# Patient Record
Sex: MALE | Race: BLACK OR AFRICAN AMERICAN | NOT HISPANIC OR LATINO | Employment: FULL TIME | URBAN - METROPOLITAN AREA
[De-identification: names, ages, dates, MRNs, and addresses within clinical notes are randomized per-mention and may not be internally consistent; named-entity substitution may affect disease eponyms.]

---

## 2019-10-25 ENCOUNTER — HOSPITAL ENCOUNTER (EMERGENCY)
Facility: HOSPITAL | Age: 34
Discharge: HOME | End: 2019-10-25
Attending: EMERGENCY MEDICINE

## 2019-10-25 ENCOUNTER — APPOINTMENT (EMERGENCY)
Dept: RADIOLOGY | Facility: HOSPITAL | Age: 34
End: 2019-10-25

## 2019-10-25 VITALS
RESPIRATION RATE: 18 BRPM | TEMPERATURE: 97.3 F | DIASTOLIC BLOOD PRESSURE: 107 MMHG | SYSTOLIC BLOOD PRESSURE: 184 MMHG | HEART RATE: 100 BPM | WEIGHT: 231.48 LBS | OXYGEN SATURATION: 100 %

## 2019-10-25 DIAGNOSIS — T78.40XA ALLERGIC STATE, INITIAL ENCOUNTER: ICD-10-CM

## 2019-10-25 DIAGNOSIS — R07.9 CHEST PAIN, UNSPECIFIED: Primary | ICD-10-CM

## 2019-10-25 PROCEDURE — 99285 EMERGENCY DEPT VISIT HI MDM: CPT

## 2019-10-25 PROCEDURE — 71046 X-RAY EXAM CHEST 2 VIEWS: CPT

## 2019-10-25 PROCEDURE — 93005 ELECTROCARDIOGRAM TRACING: CPT

## 2019-10-25 PROCEDURE — 99284 EMERGENCY DEPT VISIT MOD MDM: CPT | Performed by: PHYSICIAN ASSISTANT

## 2019-10-25 RX ORDER — DIPHENHYDRAMINE HCL 25 MG
25 TABLET ORAL EVERY 6 HOURS
Qty: 20 TABLET | Refills: 0 | Status: SHIPPED | OUTPATIENT
Start: 2019-10-25

## 2019-10-25 RX ORDER — FAMOTIDINE 20 MG/1
20 TABLET, FILM COATED ORAL 2 TIMES DAILY
Qty: 30 TABLET | Refills: 0 | Status: SHIPPED | OUTPATIENT
Start: 2019-10-25 | End: 2022-01-27

## 2019-10-25 RX ORDER — FAMOTIDINE 20 MG/1
40 TABLET, FILM COATED ORAL ONCE
Status: COMPLETED | OUTPATIENT
Start: 2019-10-25 | End: 2019-10-25

## 2019-10-25 RX ORDER — DIPHENHYDRAMINE HCL 25 MG
25 TABLET ORAL ONCE
Status: COMPLETED | OUTPATIENT
Start: 2019-10-25 | End: 2019-10-25

## 2019-10-25 RX ADMIN — FAMOTIDINE 40 MG: 20 TABLET ORAL at 09:28

## 2019-10-25 RX ADMIN — DIPHENHYDRAMINE HCL 25 MG: 25 TABLET ORAL at 09:28

## 2019-10-25 NOTE — ED PROVIDER NOTES
History  Chief Complaint   Patient presents with    Chest Pain     "I think I am allergic to pistachios alejandra for like a week after I keep eating them I get chest pains and stomach pains for like a week or 2"       34 yo M with PMH HTN presenting for evaluation of cp  Patient reports over the past 2 weeks he has had intermittent episodes of both chest pain and abdominal pain  Patient states that both the abdominal and chest pain occur only after eating pistachios  Pt reports for 3 days he did not eat pistachios and had complete resolution of symptoms  He states he ordered pasta last night and there was pistachios in it which caused the symptoms again  Pt reports he has generalized abd pain and diarrhea along with a feeling of transient chest tightness that has now resolved  Pt denies symptoms currently  Denies any wheezing, cough, stridor, sob, nausea or vomiting  Pt reports taking benadryl last night after eating pasta which relieved symptoms  No fevers, chills or sweats  None       Past Medical History:   Diagnosis Date    Hypertension        Past Surgical History:   Procedure Laterality Date    KNEE SURGERY         History reviewed  No pertinent family history  I have reviewed and agree with the history as documented  Social History     Tobacco Use    Smoking status: Never Smoker    Smokeless tobacco: Never Used   Substance Use Topics    Alcohol use: Yes    Drug use: Not Currently        Review of Systems   All other systems reviewed and are negative  Physical Exam  Physical Exam   Constitutional: He is oriented to person, place, and time  He appears well-developed and well-nourished  Non-toxic appearance  He does not appear ill  No distress  HENT:   Head: Normocephalic and atraumatic  Eyes: Conjunctivae are normal    EOM grossly intact   Neck: Normal range of motion  Neck supple  No JVD present  Cardiovascular: Normal rate  No murmur heard    Pulmonary/Chest: Effort normal  He has no decreased breath sounds  He has no wheezes  He has no rhonchi  He has no rales  Abdominal: Soft  Musculoskeletal:   FROM, steady gait, cap refill brisk, strength and sensation grossly intact throughout   Neurological: He is alert and oriented to person, place, and time  Skin: Skin is warm and dry  Capillary refill takes less than 2 seconds  Psychiatric: He has a normal mood and affect  His behavior is normal    Nursing note and vitals reviewed  Vital Signs  ED Triage Vitals [10/25/19 0854]   Temperature Pulse Respirations Blood Pressure SpO2   (!) 97 3 °F (36 3 °C) 100 18 (!) 184/107 100 %      Temp Source Heart Rate Source Patient Position - Orthostatic VS BP Location FiO2 (%)   Tympanic Monitor Sitting Left arm --      Pain Score       7           Vitals:    10/25/19 0854   BP: (!) 184/107   Pulse: 100   Patient Position - Orthostatic VS: Sitting         Visual Acuity      ED Medications  Medications   diphenhydrAMINE (BENADRYL) tablet 25 mg (25 mg Oral Given 10/25/19 0928)   famotidine (PEPCID) tablet 40 mg (40 mg Oral Given 10/25/19 5865)       Diagnostic Studies  Results Reviewed     None                 XR chest 2 views   Final Result by Marshall Barahona MD (10/25 1016)      No acute cardiopulmonary disease              Workstation performed: [id number]                    Procedures  ECG 12 Lead Documentation Only  Date/Time: 10/25/2019 9:15 AM  Performed by: Rosalee De PA-C  Authorized by: Rosalee De PA-C     Indications / Diagnosis:  Cp  ECG reviewed by me, the ED Provider: yes    Patient location:  ED  Interpretation:     Interpretation: normal    Rate:     ECG rate:  92    ECG rate assessment: normal    Rhythm:     Rhythm: sinus rhythm    Ectopy:     Ectopy: none    QRS:     QRS axis:  Normal  Conduction:     Conduction: normal    ST segments:     ST segments:  Normal  T waves:     T waves: non-specific    Comments:      qtc 395           ED Course MDM  Number of Diagnoses or Management Options  Allergic state, initial encounter:   Chest pain, unspecified:   Diagnosis management comments: 49-year-old male presenting for evaluation of intermittent episodes of chest tightness along with abdominal pain specifically after eating pistachios, patient states he did take Benadryl last night with improvement of his symptoms, he did have complete resolution of his symptoms sometime last week when he went 3 days without eating pistachios, patient was given Benadryl and Pepcid in the ED, chest x-ray and EKG were normal, he appears well, no respiratory distress, he is afebrile nontoxic no acute distress, follow up with PCP and avoid pistachios at home    All labs and imaging discussed with patient, strict return to ED precautions discussed  Pt verbalizes understanding and agrees with plan  Pt is stable for discharge    Portions of the record may have been created with voice recognition software  Occasional wrong word or "sound a like" substitutions may have occurred due to the inherent limitations of voice recognition software  Read the chart carefully and recognize, using context, where substitutions have occurred  Disposition  Final diagnoses:   Chest pain, unspecified   Allergic state, initial encounter     Time reflects when diagnosis was documented in both MDM as applicable and the Disposition within this note     Time User Action Codes Description Comment    10/25/2019  9:48 AM Latasha TOM Add [R07 9] Chest pain, unspecified     10/25/2019  9:48 AM Latasha TOM Add [T78 40XA] Allergic state, initial encounter       ED Disposition     ED Disposition Condition Date/Time Comment    Discharge Stable Fri Oct 25, 2019  9:47 AM Karyle Mater discharge to home/self care              Follow-up Information     Follow up With Specialties Details Why Contact Info Additional [facility] Call in 1 day  [address], [address]  [facility], [address], [address] [place], [place], [address]          Discharge Medication List as of 10/25/2019  9:49 AM      START taking these medications    Details   diphenhydrAMINE (BENADRYL) 25 mg tablet Take 1 tablet (25 mg total) by mouth every 6 (six) hours, Starting Fri 10/25/2019, Print      famotidine (PEPCID) 20 mg tablet Take 1 tablet (20 mg total) by mouth 2 (two) times a day, Starting Fri 10/25/2019, Print           No discharge procedures on file      ED Provider  Electronically Signed by           Garcia Fuentes PA-C  10/25/19 4426

## 2019-10-26 LAB
ATRIAL RATE: 92 BPM
P AXIS: 63 DEGREES
PR INTERVAL: 160 MS
QRS AXIS: -21 DEGREES
QRSD INTERVAL: 80 MS
QT INTERVAL: 320 MS
QTC INTERVAL: 395 MS
T WAVE AXIS: -11 DEGREES
VENTRICULAR RATE: 92 BPM

## 2019-10-26 PROCEDURE — 93010 ELECTROCARDIOGRAM REPORT: CPT | Performed by: INTERNAL MEDICINE

## 2019-10-28 DIAGNOSIS — I10 ESSENTIAL HYPERTENSION: Primary | ICD-10-CM

## 2019-10-28 RX ORDER — HYDROCHLOROTHIAZIDE 12.5 MG/1
12.5 CAPSULE, GELATIN COATED ORAL DAILY
Qty: 30 CAPSULE | Refills: 11 | Status: SHIPPED | OUTPATIENT
Start: 2019-10-28 | End: 2021-06-08

## 2019-12-12 DIAGNOSIS — T78.40XA ALLERGIC STATE, INITIAL ENCOUNTER: Primary | ICD-10-CM

## 2019-12-12 RX ORDER — EPINEPHRINE 0.3 MG/.3ML
0.3 INJECTION SUBCUTANEOUS ONCE
Qty: 0.6 ML | Refills: 0 | Status: SHIPPED | OUTPATIENT
Start: 2019-12-12 | End: 2022-01-27

## 2021-06-07 DIAGNOSIS — I10 ESSENTIAL HYPERTENSION: ICD-10-CM

## 2021-06-08 RX ORDER — HYDROCHLOROTHIAZIDE 12.5 MG/1
CAPSULE, GELATIN COATED ORAL
Qty: 30 CAPSULE | Refills: 0 | Status: SHIPPED | OUTPATIENT
Start: 2021-06-08 | End: 2022-04-07 | Stop reason: SDUPTHER

## 2021-11-23 ENCOUNTER — TELEPHONE (OUTPATIENT)
Dept: OTHER | Facility: OTHER | Age: 36
End: 2021-11-23

## 2022-02-22 ENCOUNTER — LAB (OUTPATIENT)
Dept: LAB | Facility: HOSPITAL | Age: 37
End: 2022-02-22
Attending: ALLERGY & IMMUNOLOGY
Payer: COMMERCIAL

## 2022-02-22 DIAGNOSIS — R22.1 THROAT SWELLING: ICD-10-CM

## 2022-02-22 DIAGNOSIS — I10 HYPERTENSION, UNSPECIFIED TYPE: ICD-10-CM

## 2022-02-22 DIAGNOSIS — R23.2 FLUSHING: ICD-10-CM

## 2022-02-22 DIAGNOSIS — L50.1 IDIOPATHIC URTICARIA: ICD-10-CM

## 2022-02-22 DIAGNOSIS — R14.0 ABDOMINAL DISTENSION, GASEOUS: ICD-10-CM

## 2022-02-22 DIAGNOSIS — R10.9 ABDOMINAL PAIN, UNSPECIFIED ABDOMINAL LOCATION: ICD-10-CM

## 2022-02-22 DIAGNOSIS — R07.89 CHEST TIGHTNESS: ICD-10-CM

## 2022-02-22 DIAGNOSIS — R10.30 LOWER ABDOMINAL PAIN: ICD-10-CM

## 2022-02-22 LAB
IGA SERPL-MCNC: 220 MG/DL (ref 70–400)
MAGNESIUM SERPL-MCNC: 2 MG/DL (ref 1.6–2.6)

## 2022-02-22 PROCEDURE — 82784 ASSAY IGA/IGD/IGG/IGM EACH: CPT

## 2022-02-22 PROCEDURE — 86258 DGP ANTIBODY EACH IG CLASS: CPT

## 2022-02-22 PROCEDURE — 86364 TISS TRNSGLTMNASE EA IG CLAS: CPT

## 2022-02-22 PROCEDURE — 83735 ASSAY OF MAGNESIUM: CPT

## 2022-02-22 PROCEDURE — 86231 EMA EACH IG CLASS: CPT

## 2022-02-23 LAB — TTG IGG SER-ACNC: <2 U/ML (ref 0–5)

## 2022-02-24 LAB
ENDOMYSIUM IGA SER QL: NEGATIVE
GLIADIN PEPTIDE IGA SER-ACNC: 4 UNITS (ref 0–19)
GLIADIN PEPTIDE IGG SER-ACNC: 3 UNITS (ref 0–19)
IGA SERPL-MCNC: 221 MG/DL (ref 90–386)
TTG IGA SER-ACNC: <2 U/ML (ref 0–3)
TTG IGA SER-ACNC: <2 U/ML (ref 0–3)
TTG IGG SER-ACNC: <2 U/ML (ref 0–5)

## 2022-03-02 ENCOUNTER — HOSPITAL ENCOUNTER (OUTPATIENT)
Dept: CT IMAGING | Facility: HOSPITAL | Age: 37
Discharge: HOME | End: 2022-03-02
Payer: COMMERCIAL

## 2022-03-02 DIAGNOSIS — R14.0 ABDOMINAL DISTENSION, GASEOUS: ICD-10-CM

## 2022-03-02 DIAGNOSIS — R10.30 ABDOMINAL PAIN, LOWER: ICD-10-CM

## 2022-03-02 PROCEDURE — G1004 CDSM NDSC: HCPCS

## 2022-03-02 PROCEDURE — 74177 CT ABD & PELVIS W/CONTRAST: CPT

## 2022-03-02 RX ADMIN — IOHEXOL 100 ML: 350 INJECTION, SOLUTION INTRAVENOUS at 10:45

## 2022-03-24 ENCOUNTER — TELEPHONE (OUTPATIENT)
Dept: FAMILY MEDICINE CLINIC | Facility: CLINIC | Age: 37
End: 2022-03-24

## 2022-03-24 NOTE — TELEPHONE ENCOUNTER
I called and left a message for [facility] requesting he please return the office's call he was referred to our office by Cindy Mccallum

## 2022-03-24 NOTE — TELEPHONE ENCOUNTER
Pricilla Martinez called from [facility]  to refer pt to our office to see Jonathan Marsh for high bp  She requested someone call pt to schedule an appointment

## 2022-03-25 NOTE — TELEPHONE ENCOUNTER
lmom requesting call back from pt to schedule a new pt  Appointment, WE received a call form Dr Bernard

## 2022-03-28 NOTE — TELEPHONE ENCOUNTER
lmom requesting call back from [facility] to schedule new pt appointment  Have attempted to contact pt numerous times with no response

## 2022-04-05 ENCOUNTER — HOSPITAL ENCOUNTER (OUTPATIENT)
Dept: RADIOLOGY | Facility: HOSPITAL | Age: 37
Discharge: HOME | End: 2022-04-05
Payer: COMMERCIAL

## 2022-04-05 DIAGNOSIS — M54.17 LUMBOSACRAL NEURITIS: ICD-10-CM

## 2022-04-05 DIAGNOSIS — M54.2 CERVICALGIA: ICD-10-CM

## 2022-04-05 DIAGNOSIS — M54.12 BRACHIAL NEURITIS: ICD-10-CM

## 2022-04-05 DIAGNOSIS — M54.15 RADICULOPATHY OF THORACOLUMBAR REGION: ICD-10-CM

## 2022-04-05 DIAGNOSIS — M46.05: ICD-10-CM

## 2022-04-05 PROCEDURE — 72110 X-RAY EXAM L-2 SPINE 4/>VWS: CPT

## 2022-04-05 PROCEDURE — 72220 X-RAY EXAM SACRUM TAILBONE: CPT

## 2022-04-05 PROCEDURE — 72050 X-RAY EXAM NECK SPINE 4/5VWS: CPT

## 2022-04-07 ENCOUNTER — DOCUMENTATION (OUTPATIENT)
Dept: CARDIOLOGY CLINIC | Facility: CLINIC | Age: 37
End: 2022-04-07

## 2022-04-07 DIAGNOSIS — I10 ESSENTIAL HYPERTENSION: Primary | ICD-10-CM

## 2022-04-07 RX ORDER — HYDROCHLOROTHIAZIDE 12.5 MG/1
12.5 CAPSULE, GELATIN COATED ORAL DAILY
Qty: 30 CAPSULE | Refills: 11 | Status: SHIPPED | OUTPATIENT
Start: 2022-04-07 | End: 2023-04-07

## 2022-09-13 ENCOUNTER — CONSULT (OUTPATIENT)
Dept: SURGERY | Facility: CLINIC | Age: 37
End: 2022-09-13
Payer: COMMERCIAL

## 2022-09-13 VITALS — TEMPERATURE: 97.4 F | RESPIRATION RATE: 16 BRPM | WEIGHT: 227.8 LBS | BODY MASS INDEX: 31.89 KG/M2 | HEIGHT: 71 IN

## 2022-09-13 DIAGNOSIS — M53.3 SACROILIAC DYSFUNCTION: ICD-10-CM

## 2022-09-13 DIAGNOSIS — M62.08 RECTUS DIASTASIS: Primary | ICD-10-CM

## 2022-09-13 PROCEDURE — 99243 OFF/OP CNSLTJ NEW/EST LOW 30: CPT | Performed by: SURGERY

## 2022-09-13 NOTE — LETTER
September 16, 2022     Patient: Betty Kay  YOB: 1985  Date of Visit: 9/13/2022      To Whom it May Concern:    Betty Kay is under my professional care  Ascencionpetar Bui was seen in my office on 9/13/2022  Due to his ongoing complaints of back pain  And hip pain do not recommend prolonged periods of sitting until he has been evaluated by Orthopedic surgery  If you have any questions or concerns, please don't hesitate to call           Sincerely,          Brie Rowland MD        CC: Betty Rahul

## 2022-09-13 NOTE — PROGRESS NOTES
Assessment/Plan:    Rectus diastasis  I reviewed his CT scan with him and showed him his images  On examination does have a small separation from his rectus muscle superiorly  He has a very small subcentimeter umbilical hernia  Discussed his small hernia explained this can be repair but I do not believe the source of his symptoms this time  Regards to his diastasis again this can be repaired with a plication but this often falls under the purview of Plastic surgery and I will refer him to see Plastic surgery for this  Sacroiliac dysfunction  He is very concerned that he has some form of sacroiliac dysfunction or injury as that is the source of lot of his symptoms and pains  He is asking about MRI for evaluation  I told him he should be evaluated by Orthopedics prior to any imaging or referral to physical therapy  Diagnoses and all orders for this visit:    Rectus diastasis  -     Ambulatory Referral to Plastic Surgery; Future    Sacroiliac dysfunction  -     Ambulatory Referral to Orthopedic Surgery; Future          Subjective:      Patient ID: [id number] Kearns is a 39 y o  male  Mr Genesis Mata is a 59-year-old gentleman here for evaluation of abdominal pain  He has multiple complaints for which he is looking for answers  He is a  and is very active  He states he had injuries he believes to his SI joint plane football many years ago  He follows with a chiropractor to be reset every so often  So he has significant pains back there  He believes that this has caused him to be out of alignment causing abdominal pain and discomfort  He had evaluation with a CT scan in March  There were no acute findings  He has lot of complaints of abdominal bloating and the pain across the lower abdomen  He has been wearing a a elastic abdominal binder for relief    He states he had a client that was with physical therapist that examined his abdominal wall and push pins rectus muscles and said he had a large hernia and was referred here for evaluation  He denies nausea vomiting fevers or chills  The following portions of the patient's history were reviewed and updated as appropriate: allergies, current medications, past family history, past medical history, past social history, past surgical history and problem list     Review of Systems   Constitutional: Negative for chills and fever  HENT: Negative for ear pain and sore throat  Eyes: Negative for pain and visual disturbance  Respiratory: Negative for cough and shortness of breath  Cardiovascular: Negative for chest pain and palpitations  Gastrointestinal: Positive for abdominal pain  Negative for vomiting  Genitourinary: Negative for dysuria and hematuria  Musculoskeletal: Positive for back pain  Negative for arthralgias  Skin: Negative for color change and rash  Neurological: Negative for seizures and syncope  Psychiatric/Behavioral: Negative for agitation and behavioral problems  All other systems reviewed and are negative  Objective:      Temp (!) 97 4 °F (36 3 °C)   Resp 16   Ht 5' 11" (1 803 m)   Wt 103 kg (227 lb 12 8 oz)   BMI 31 77 kg/m²          Physical Exam  Vitals and nursing note reviewed  Constitutional:       General: He is not in acute distress  Appearance: He is well-developed  He is not diaphoretic  HENT:      Head: Normocephalic and atraumatic  Eyes:      Pupils: Pupils are equal, round, and reactive to light  Cardiovascular:      Rate and Rhythm: Normal rate and regular rhythm  Heart sounds: Normal heart sounds  No murmur heard  Pulmonary:      Effort: Pulmonary effort is normal  No respiratory distress  Breath sounds: Normal breath sounds  No wheezing  Abdominal:      General: Bowel sounds are normal       Palpations: Abdomen is soft  Comments: Mild rectus diastasis upper abdomen  Small subcentimeter umbilical hernia     Musculoskeletal:         General: Normal range of motion  Cervical back: Normal range of motion and neck supple  Skin:     General: Skin is warm and dry  Neurological:      Mental Status: He is alert and oriented to person, place, and time     Psychiatric:         Behavior: Behavior normal

## 2022-09-16 NOTE — ASSESSMENT & PLAN NOTE
I reviewed his CT scan with him and showed him his images  On examination does have a small separation from his rectus muscle superiorly  He has a very small subcentimeter umbilical hernia  Discussed his small hernia explained this can be repair but I do not believe the source of his symptoms this time  Regards to his diastasis again this can be repaired with a plication but this often falls under the purview of Plastic surgery and I will refer him to see Plastic surgery for this

## 2022-09-16 NOTE — ASSESSMENT & PLAN NOTE
He is very concerned that he has some form of sacroiliac dysfunction or injury as that is the source of lot of his symptoms and pains  He is asking about MRI for evaluation  I told him he should be evaluated by Orthopedics prior to any imaging or referral to physical therapy

## 2022-12-05 ENCOUNTER — CONSULT (OUTPATIENT)
Dept: PLASTIC SURGERY | Facility: CLINIC | Age: 37
End: 2022-12-05

## 2022-12-05 VITALS — HEIGHT: 71 IN | WEIGHT: 227 LBS | BODY MASS INDEX: 31.78 KG/M2

## 2022-12-05 DIAGNOSIS — K42.9 UMBILICAL HERNIA WITHOUT OBSTRUCTION AND WITHOUT GANGRENE: ICD-10-CM

## 2022-12-05 DIAGNOSIS — R10.84 GENERALIZED ABDOMINAL PAIN: Primary | ICD-10-CM

## 2022-12-05 DIAGNOSIS — M62.08 DIASTASIS OF RECTUS ABDOMINIS: ICD-10-CM

## 2022-12-05 DIAGNOSIS — M62.08 RECTUS DIASTASIS: ICD-10-CM

## 2022-12-06 NOTE — PROGRESS NOTES
Plastic Surgery Consult    Reason for visit: muscular abdominal pain    HPI:  Patient is a 38 y/o male who presents with vague abdominal pain, worse in the midline central region of his abdomen  Patient is a  and is very active and fit  He states that over the past two years he has had increasing pain along the vertical midline and he describes as sharp tearing pain  He has been followed by general surgery and underwent CT scan which did not show any hernia  He was evaluated by general surgery and was noted to have small umbilical hernia  ROS: 12 pt ROS negative, except as otherwise noted in HPI    PMH: sacroiliac dysfunction  FamHx: non-contrib  SurgHx: left ACL repair, right thumb surgery  SocHx: no tobacco, +occasional cigar  Meds: no blood thinners  Allergies: NKDA    PE:  There were no vitals filed for this visit  General: NC/AT, breathing comfortably on RA  Neuro: CN II-XII grossly intact, symmetric reflexes  HEENT: PERRLA, EOMI, external ears normal, no lesions or deformities, neck supple, trachea midline  Respiratory: CTAB, normal respiratory effort  Cardio: RRR, normal S1, S2, no murmur, rubs, gallops  GI: soft, non-tender, non-distended  Extremities/MSK: normal alignment, mobility, gait, no edema  Skin: no rashes, lesions, subcutaneous nodules    BMI: 31 6  Abdomen:  Mild abdominal lipodystrophy  3 cm diastasis, no hernia  Small umbilical hernia    A/P: 38 y/o male with small umbilical hernia and 3 cm rectus diastasis  -I discussed with the patient that I cannot guarantee that a rectus plication would completely resolve the patient's abdominal pain, as he describes it vaguely and diffusely over his entire abdomen but more concentrated at the midline  I discussed rectus plication would preclude him from any core activities for at least 3 months to allow to heal  The plication would also require a long horizontal scar and the umbilicus would be floated   The umbilical hernia would be repaired as well    -All questions answered, concerns addressed   -Will email quote        Ernst Mendez MD   [facility]   [address], [address], [address]    Office: [phone]

## 2024-08-06 ENCOUNTER — OCCMED (OUTPATIENT)
Dept: URGENT CARE | Age: 39
End: 2024-08-06

## 2024-08-06 ENCOUNTER — APPOINTMENT (OUTPATIENT)
Dept: LAB | Age: 39
End: 2024-08-06

## 2024-08-06 DIAGNOSIS — Z02.1 PRE-EMPLOYMENT EXAMINATION: Primary | ICD-10-CM

## 2024-08-06 DIAGNOSIS — Z02.1 PRE-EMPLOYMENT HEALTH SCREENING EXAMINATION: ICD-10-CM

## 2024-08-06 PROCEDURE — 36415 COLL VENOUS BLD VENIPUNCTURE: CPT

## 2024-08-06 PROCEDURE — 86480 TB TEST CELL IMMUN MEASURE: CPT

## 2024-08-07 LAB
GAMMA INTERFERON BACKGROUND BLD IA-ACNC: 0.04 IU/ML
M TB IFN-G BLD-IMP: NEGATIVE
M TB IFN-G CD4+ BCKGRND COR BLD-ACNC: 0.03 IU/ML
M TB IFN-G CD4+ BCKGRND COR BLD-ACNC: 0.04 IU/ML
MITOGEN IGNF BCKGRD COR BLD-ACNC: 9.96 IU/ML

## 2024-09-09 ENCOUNTER — OFFICE VISIT (OUTPATIENT)
Age: 39
End: 2024-09-09
Payer: COMMERCIAL

## 2024-09-09 ENCOUNTER — APPOINTMENT (OUTPATIENT)
Age: 39
End: 2024-09-09
Payer: COMMERCIAL

## 2024-09-09 VITALS
WEIGHT: 250 LBS | SYSTOLIC BLOOD PRESSURE: 150 MMHG | HEIGHT: 71 IN | BODY MASS INDEX: 35 KG/M2 | DIASTOLIC BLOOD PRESSURE: 110 MMHG

## 2024-09-09 DIAGNOSIS — S46.212A RUPTURE OF LEFT DISTAL BICEPS TENDON, INITIAL ENCOUNTER: Primary | ICD-10-CM

## 2024-09-09 DIAGNOSIS — M25.522 PAIN IN LEFT ELBOW: ICD-10-CM

## 2024-09-09 PROCEDURE — 73080 X-RAY EXAM OF ELBOW: CPT

## 2024-09-09 PROCEDURE — 99203 OFFICE O/P NEW LOW 30 MIN: CPT | Performed by: PHYSICIAN ASSISTANT

## 2024-09-09 RX ORDER — FAMOTIDINE 10 MG
10 TABLET ORAL 2 TIMES DAILY
COMMUNITY

## 2024-09-09 RX ORDER — AMLODIPINE BESYLATE 10 MG/1
10 TABLET ORAL DAILY
COMMUNITY
Start: 2024-08-12

## 2024-09-09 RX ORDER — LORATADINE 10 MG/1
10 TABLET ORAL DAILY
COMMUNITY

## 2024-09-09 NOTE — PROGRESS NOTES
"Patient Name:  Marquis Peña  MRN:  [id number]    Assessment & Plan     Left elbow injury while at work 8/29/2024.  Possible distal biceps tendon partial tear versus rupture.  Urgent MRI of the left elbow will be obtained for further evaluation of the distal biceps tendon.  In the meantime continue sling as needed for comfort.  Ice and anti-inflammatories as needed.  Separate work forms completed today with restrictions of no use of the left upper extremity.  Follow-up after MRI with one of our sports surgeons.    Chief Complaint     Left elbow injury    History of the Present Illness     Marquis Peña is a 38 y.o. right-hand-dominant male who reports to the office today for evaluation of his left elbow.  Patient sustained a work injury on 8/29/2024.  Patient works as a .  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  He states he felt a tearing sensation in his left elbow and noted onset of significant pain swelling and bruising.  He also noted associated swelling and a slight deformity about his biceps muscle on the left.  He did note initial significant pain which has since subsided.  He still notes persistent discomfort and weakness in the left upper extremity with associated swelling.  He currently takes nothing for pain.  He has been utilizing a sling for comfort and utilizing ice as well.  He denies any fevers or chills.    Physical Exam     BP (!) 150/110 (BP Location: Left arm, Patient Position: Sitting, Cuff Size: Large)   Ht 5' 11\" (1.803 m)   Wt 113 kg (250 lb)   BMI 34.87 kg/m²     Left elbow: Skin intact.  Soft tissue swelling and ecchymosis are present in the proximal forearm.  No erythema.  There does appear to be a slight Andrew deformity about the biceps musculature on the left when compared to the right.  No palpable deformity over the distal biceps tendon although there is significant tenderness.  Negative hook test.  Full " elbow flexion and extension with pain.  Full pronation and supination of the wrist with pain in the elbow as well.  Elbow stable to varus nondistressed.  4 out of 5 biceps strength.  5 out of 5 tricep strength.  Sensation intact median ulnar and radial nerves.  2+ radial pulse    Eyes: Anicteric sclerae.  ENT: Trachea midline.  Lungs: Normal respiratory effort.  CV: Capillary refill is less than 2 seconds.  Skin: Intact without erythema.  Lymph: No palpable lymphadenopathy.  Neuro: Sensation is grossly intact to light touch.  Psych: Mood and affect are appropriate.    Data Review     I have personally reviewed pertinent films in PACS, and my interpretation follows:    X-rays left elbow 9/9/2024: No acute osseous abnormality.  No fracture or dislocation.  No significant degenerative changes.    Past Medical History:   Diagnosis Date    Allergic rhinitis     Anaphylaxis     Food intolerance     GERD (gastroesophageal reflux disease)     Hypertension     Urticaria        Past Surgical History:   Procedure Laterality Date    KNEE SURGERY         Allergies   Allergen Reactions    Nuts - Food Allergy Anaphylaxis    Pollen Extract Allergic Rhinitis       Current Outpatient Medications on File Prior to Visit   Medication Sig Dispense Refill    amLODIPine (NORVASC) 10 mg tablet Take 10 mg by mouth daily      diphenhydrAMINE (BENADRYL) 25 mg tablet Take 1 tablet (25 mg total) by mouth every 6 (six) hours 20 tablet 0    famotidine (PEPCID) 10 mg tablet Take 10 mg by mouth 2 (two) times a day      loratadine (CLARITIN) 10 mg tablet Take 10 mg by mouth daily      EPINEPHrine (Auvi-Q) 0.3 mg/0.3 mL SOAJ Inject 0.3 mL (0.3 mg total) into a muscle once for 1 dose 4 each 3    hydrochlorothiazide (MICROZIDE) 12.5 mg capsule Take 1 capsule (12.5 mg total) by mouth daily 30 capsule 11    pantoprazole (PROTONIX) 20 mg tablet Take 1 tablet (20 mg total) by mouth daily (Patient not taking: Reported on 9/9/2024) 30 tablet 3     No current  facility-administered medications on file prior to visit.       Social History     Tobacco Use    Smoking status: Never    Smokeless tobacco: Never   Vaping Use    Vaping status: Never Used   Substance Use Topics    Alcohol use: Yes    Drug use: Yes     Types: Marijuana       History reviewed. No pertinent family history.    Review of Systems     As stated in the HPI. All other systems reviewed and are negative.

## 2024-09-14 ENCOUNTER — HOSPITAL ENCOUNTER (OUTPATIENT)
Dept: RADIOLOGY | Facility: HOSPITAL | Age: 39
Discharge: HOME | End: 2024-09-14

## 2024-09-14 DIAGNOSIS — S46.212A RUPTURE OF LEFT DISTAL BICEPS TENDON, INITIAL ENCOUNTER: ICD-10-CM

## 2024-09-14 PROCEDURE — 73221 MRI JOINT UPR EXTREM W/O DYE: CPT

## 2024-09-16 ENCOUNTER — TELEPHONE (OUTPATIENT)
Age: 39
End: 2024-09-16

## 2024-09-16 NOTE — TELEPHONE ENCOUNTER
----- Message from Taiwo White PA-C sent at 9/16/2024  7:50 AM EDT -----  He needs an appointment with either Dr. Kruger or Dr. Lopez or Dr. Matamoros sometime this week for surgical discussion.    Thanks!!  Taiwo Whiet  ----- Message -----  From: Interface, Radiology Results In  Sent: 9/14/2024   7:43 PM EDT  To: Taiwo White PA-C

## 2024-09-16 NOTE — TELEPHONE ENCOUNTER
CLM on VM w/C. Cindy's msg to schedule appt w/Saturnino Suresh, or Jessica this week for surgical discussion as MRI results are back. Await CB. Please schedule. Thank you.

## 2024-09-17 ENCOUNTER — TELEPHONE (OUTPATIENT)
Age: 39
End: 2024-09-17

## 2024-09-17 NOTE — TELEPHONE ENCOUNTER
CLM on pt's id VM w/C. Cindy's msg.  Await CB to schedule w/Saturnino Turk or Jessica.  Please schedule if pt returns call. . Thank you.

## 2024-09-17 NOTE — TELEPHONE ENCOUNTER
----- Message from Taiwo White PA-C sent at 9/16/2024  7:50 AM EDT -----  He needs an appointment with either Dr. Kruger or Dr. Lopez or Dr. Matamoros sometime this week for surgical discussion.    Thanks!!  Taiwo White  ----- Message -----  From: Interface, Radiology Results In  Sent: 9/14/2024   7:43 PM EDT  To: Taiwo White PA-C

## 2024-09-18 NOTE — TELEPHONE ENCOUNTER
Caller: Patient    Doctor: Saskia    Reason for call: Tra called to schedule an appt with Dr. Kruger to discuss surgical options. MRI results are in the chart.  Patient will travel to [place] or [facility].     Call back#: [phone]

## 2024-09-20 ENCOUNTER — OFFICE VISIT (OUTPATIENT)
Dept: OBGYN CLINIC | Facility: MEDICAL CENTER | Age: 39
End: 2024-09-20

## 2024-09-20 VITALS
BODY MASS INDEX: 35 KG/M2 | DIASTOLIC BLOOD PRESSURE: 99 MMHG | HEART RATE: 80 BPM | SYSTOLIC BLOOD PRESSURE: 151 MMHG | HEIGHT: 71 IN | WEIGHT: 250 LBS

## 2024-09-20 DIAGNOSIS — S46.212A RUPTURE OF LEFT DISTAL BICEPS TENDON, INITIAL ENCOUNTER: Primary | ICD-10-CM

## 2024-09-20 PROCEDURE — 99204 OFFICE O/P NEW MOD 45 MIN: CPT | Performed by: ORTHOPAEDIC SURGERY

## 2024-09-20 RX ORDER — TRANEXAMIC ACID 10 MG/ML
1000 INJECTION, SOLUTION INTRAVENOUS ONCE
Status: CANCELLED | OUTPATIENT
Start: 2024-09-24 | End: 2024-09-20

## 2024-09-20 RX ORDER — CHLORHEXIDINE GLUCONATE ORAL RINSE 1.2 MG/ML
15 SOLUTION DENTAL ONCE
Status: CANCELLED | OUTPATIENT
Start: 2024-09-20 | End: 2024-09-20

## 2024-09-20 RX ORDER — CEFAZOLIN SODIUM 2 G/50ML
2000 SOLUTION INTRAVENOUS ONCE
Status: CANCELLED | OUTPATIENT
Start: 2024-09-24 | End: 2024-09-20

## 2024-09-20 NOTE — H&P (VIEW-ONLY)
"Orthopaedic Surgery - Office Note  Marquis Peña (38 y.o. male)   : 1985   MRN: [id number]  Encounter Date: 2024    Assessment / Plan    Left distal biceps rupture    The diagnosis and treatment options were reviewed.  The patient wishes to proceed with Left distal biceps repair.  The risks, benefits, and alternatives were discussed.  Written consent was obtained.  Patient was fitted with an ARC sling today as it will be more comfortable with the cervical strap due to his cervical DDD  Explained the triceps issue seen on MRI appears to be a chronic issue and does not need to be address at this time.  If he has persistent triceps pain following surgery it can be address at a later time.   Follow-up:  Return for Post op.      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 male who presents for evaluation of left elbow pain.  Patient sustained a work injury on 2024.  Patient works as a .  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  He states he felt a tearing sensation in his left elbow and noted onset of significant pain swelling and bruising.  He was seen by Taiwo Mi PA-C who ordered the MRI and referred the patient  here today.     Treatment Summary  Medications / Modalities  None  Bracing / Immobilization  None  Physical Therapy  None  Injections  None  Prior Surgeries  None  Other Treatments  None    Employment / Current Status      Sport / Organization / Current Status  Weight lifting      Review of Systems  Pertinent items are noted in HPI.  All other systems were reviewed and are negative.      Physical Exam  /99   Pulse 80   Ht 5' 11\" (1.803 m)   Wt 113 kg (250 lb)   BMI 34.87 kg/m²   Cons: Appears well.  No apparent distress.  Psych: Alert. " Oriented x3.  Mood and affect normal.  Eyes: PERRLA, EOMI  Resp: Normal effort.  No audible wheezing or stridor.  CV: Palpable pulse.  No discernable arrhythmia.  No LE edema.  Lymph:  No palpable cervical, axillary, or inguinal lymphadenopathy.  Skin: Warm.  No palpable masses.  No visible lesions.  Neuro: Normal muscle tone.  Normal and symmetric DTR's.     Left Elbow Exam  Alignment:  Normal elbow alignment and carrying angle.  Inspection:  No swelling. No edema. No erythema. No ecchymosis.  Palpation:   distal biceps tenderness. Distal biceps tendon is not palpable on exam today   ROM:  Normal elbow ROM.  Strength:  Triceps 5/5. Pain with resisted supination   Stability:  No objective elbow instability.  Stable varus and valgus stress.  Tests:  (-) Hook test.  Neurovascular:  Sensation intact in Ax/R/M/U nerve distributions. 2+ DP & PT pulses.       Studies Reviewed  MRI of left elbow - complete distal biceps rupture with 4.6 cm of retraction, small partial tear triceps along the deeper fibers      Procedures  No procedures today.    Medical, Surgical, Family, and Social History  The patient's medical history, family history, and social history, were reviewed and updated as appropriate.    Past Medical History:   Diagnosis Date    Allergic rhinitis     Anaphylaxis     Food intolerance     GERD (gastroesophageal reflux disease)     Hypertension     Urticaria        Past Surgical History:   Procedure Laterality Date    KNEE SURGERY         History reviewed. No pertinent family history.    Social History     Occupational History    Not on file   Tobacco Use    Smoking status: Never     Passive exposure: Past    Smokeless tobacco: Never   Vaping Use    Vaping status: Never Used   Substance and Sexual Activity    Alcohol use: Yes    Drug use: Yes     Types: Marijuana    Sexual activity: Not on file       Allergies   Allergen Reactions    Nuts - Food Allergy Anaphylaxis    Pollen Extract Allergic Rhinitis          Current Outpatient Medications:     amLODIPine (NORVASC) 10 mg tablet, Take 10 mg by mouth daily, Disp: , Rfl:     famotidine (PEPCID) 10 mg tablet, Take 10 mg by mouth 2 (two) times a day, Disp: , Rfl:     loratadine (CLARITIN) 10 mg tablet, Take 10 mg by mouth daily, Disp: , Rfl:     diphenhydrAMINE (BENADRYL) 25 mg tablet, Take 1 tablet (25 mg total) by mouth every 6 (six) hours (Patient not taking: Reported on 9/20/2024), Disp: 20 tablet, Rfl: 0    EPINEPHrine (Auvi-Q) 0.3 mg/0.3 mL SOAJ, Inject 0.3 mL (0.3 mg total) into a muscle once for 1 dose, Disp: 4 each, Rfl: 3    hydrochlorothiazide (MICROZIDE) 12.5 mg capsule, Take 1 capsule (12.5 mg total) by mouth daily, Disp: 30 capsule, Rfl: 11    pantoprazole (PROTONIX) 20 mg tablet, Take 1 tablet (20 mg total) by mouth daily (Patient not taking: Reported on 9/9/2024), Disp: 30 tablet, Rfl: 3      Emily Gómez MA    Scribe Attestation      I,:  Emily Gómez MA am acting as a scribe while in the presence of the attending physician.:       I,:  Dave Kruger MD personally performed the services described in this documentation    as scribed in my presence.:

## 2024-09-20 NOTE — PROGRESS NOTES
"Orthopaedic Surgery - Office Note  Marquis Peña (38 y.o. male)   : 1985   MRN: [id number]  Encounter Date: 2024    Assessment / Plan    Left distal biceps rupture    The diagnosis and treatment options were reviewed.  The patient wishes to proceed with Left distal biceps repair.  The risks, benefits, and alternatives were discussed.  Written consent was obtained.  Patient was fitted with an ARC sling today as it will be more comfortable with the cervical strap due to his cervical DDD  Explained the triceps issue seen on MRI appears to be a chronic issue and does not need to be address at this time.  If he has persistent triceps pain following surgery it can be address at a later time.   Follow-up:  Return for Post op.      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 male who presents for evaluation of left elbow pain.  Patient sustained a work injury on 2024.  Patient works as a .  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  He states he felt a tearing sensation in his left elbow and noted onset of significant pain swelling and bruising.  He was seen by Taiwo Mi PA-C who ordered the MRI and referred the patient  here today.     Treatment Summary  Medications / Modalities  None  Bracing / Immobilization  None  Physical Therapy  None  Injections  None  Prior Surgeries  None  Other Treatments  None    Employment / Current Status      Sport / Organization / Current Status  Weight lifting      Review of Systems  Pertinent items are noted in HPI.  All other systems were reviewed and are negative.      Physical Exam  /99   Pulse 80   Ht 5' 11\" (1.803 m)   Wt 113 kg (250 lb)   BMI 34.87 kg/m²   Cons: Appears well.  No apparent distress.  Psych: Alert. " Oriented x3.  Mood and affect normal.  Eyes: PERRLA, EOMI  Resp: Normal effort.  No audible wheezing or stridor.  CV: Palpable pulse.  No discernable arrhythmia.  No LE edema.  Lymph:  No palpable cervical, axillary, or inguinal lymphadenopathy.  Skin: Warm.  No palpable masses.  No visible lesions.  Neuro: Normal muscle tone.  Normal and symmetric DTR's.     Left Elbow Exam  Alignment:  Normal elbow alignment and carrying angle.  Inspection:  No swelling. No edema. No erythema. No ecchymosis.  Palpation:   distal biceps tenderness. Distal biceps tendon is not palpable on exam today   ROM:  Normal elbow ROM.  Strength:  Triceps 5/5. Pain with resisted supination   Stability:  No objective elbow instability.  Stable varus and valgus stress.  Tests:  (-) Hook test.  Neurovascular:  Sensation intact in Ax/R/M/U nerve distributions. 2+ DP & PT pulses.       Studies Reviewed  MRI of left elbow - complete distal biceps rupture with 4.6 cm of retraction, small partial tear triceps along the deeper fibers      Procedures  No procedures today.    Medical, Surgical, Family, and Social History  The patient's medical history, family history, and social history, were reviewed and updated as appropriate.    Past Medical History:   Diagnosis Date    Allergic rhinitis     Anaphylaxis     Food intolerance     GERD (gastroesophageal reflux disease)     Hypertension     Urticaria        Past Surgical History:   Procedure Laterality Date    KNEE SURGERY         History reviewed. No pertinent family history.    Social History     Occupational History    Not on file   Tobacco Use    Smoking status: Never     Passive exposure: Past    Smokeless tobacco: Never   Vaping Use    Vaping status: Never Used   Substance and Sexual Activity    Alcohol use: Yes    Drug use: Yes     Types: Marijuana    Sexual activity: Not on file       Allergies   Allergen Reactions    Nuts - Food Allergy Anaphylaxis    Pollen Extract Allergic Rhinitis          Current Outpatient Medications:     amLODIPine (NORVASC) 10 mg tablet, Take 10 mg by mouth daily, Disp: , Rfl:     famotidine (PEPCID) 10 mg tablet, Take 10 mg by mouth 2 (two) times a day, Disp: , Rfl:     loratadine (CLARITIN) 10 mg tablet, Take 10 mg by mouth daily, Disp: , Rfl:     diphenhydrAMINE (BENADRYL) 25 mg tablet, Take 1 tablet (25 mg total) by mouth every 6 (six) hours (Patient not taking: Reported on 9/20/2024), Disp: 20 tablet, Rfl: 0    EPINEPHrine (Auvi-Q) 0.3 mg/0.3 mL SOAJ, Inject 0.3 mL (0.3 mg total) into a muscle once for 1 dose, Disp: 4 each, Rfl: 3    hydrochlorothiazide (MICROZIDE) 12.5 mg capsule, Take 1 capsule (12.5 mg total) by mouth daily, Disp: 30 capsule, Rfl: 11    pantoprazole (PROTONIX) 20 mg tablet, Take 1 tablet (20 mg total) by mouth daily (Patient not taking: Reported on 9/9/2024), Disp: 30 tablet, Rfl: 3      Emily Gómez MA    Scribe Attestation      I,:  Emily Gómez MA am acting as a scribe while in the presence of the attending physician.:       I,:  Dave Kruger MD personally performed the services described in this documentation    as scribed in my presence.:

## 2024-09-20 NOTE — LETTER
September 20, 2024     Patient: Marquis Peña  YOB: 1985  Date of Visit: 9/20/2024      To Whom it May Concern:    Marquis Peña is under my professional care.  was seen in my office on 9/20/2024.  is schedule for surgery 9/24/24 and will remain out of work until his post op visit on 9/30/24.    If you have any questions or concerns, please don't hesitate to call.         Sincerely,          Dave Kruger MD        CC: No Recipients

## 2024-09-23 ENCOUNTER — ANESTHESIA EVENT (OUTPATIENT)
Age: 39
End: 2024-09-23
Payer: COMMERCIAL

## 2024-09-23 NOTE — PRE-PROCEDURE INSTRUCTIONS
Pre-Surgery Instructions:   Medication Instructions    amLODIPine (NORVASC) 10 mg tablet Take day of surgery.    famotidine (PEPCID) 10 mg tablet Take day of surgery.    loratadine (CLARITIN) 10 mg tablet Hold day of surgery.    Medication instructions for day surgery reviewed. Please use only a sip of water to take your instructed medications. Avoid all over the counter vitamins, supplements and NSAIDS for one week prior to surgery per anesthesia guidelines. Tylenol is ok to take as needed.     You will receive a call one business day prior to surgery with an arrival time and hospital directions. If your surgery is scheduled on a Monday, the hospital will be calling you on the Friday prior to your surgery. If you have not heard from anyone by 8pm, please call the hospital supervisor through the hospital  at [phone]. ([facility] [phone] or [place] [phone]).    Do not eat or drink anything after midnight the night before your surgery, including candy, mints, lifesavers, or chewing gum. Do not drink alcohol 24hrs before your surgery. Try not to smoke at least 24hrs before your surgery.       Follow the pre surgery showering instructions as listed in the “My Surgical Experience Booklet” or otherwise provided by your surgeon's office. Do not use a blade to shave the surgical area 1 week before surgery. It is okay to use a clean electric clippers up to 24 hours before surgery. Do not apply any lotions, creams, including makeup, cologne, deodorant, or perfumes after showering on the day of your surgery. Do not use dry shampoo, hair spray, hair gel, or any type of hair products.     No contact lenses, eye make-up, or artificial eyelashes. Remove nail polish, including gel polish, and any artificial, gel, or acrylic nails if possible. Remove all jewelry including rings and body piercing jewelry.     Wear causal clothing that is easy to take on and off. Consider your type of surgery.    Keep any  valuables, jewelry, piercings at home. Please bring any specially ordered equipment (sling, braces) if indicated.    Arrange for a responsible person to drive you to and from the hospital on the day of your surgery. Please confirm the visitor policy for the day of your procedure when you receive your phone call with an arrival time.     Call the surgeon's office with any new illnesses, exposures, or additional questions prior to surgery.    Please reference your “My Surgical Experience Booklet” for additional information to prepare for your upcoming surgery.    Pt verbalized understanding of shower and med instructions. Pt instructed to stop nsaids and supplements prior to surgery.

## 2024-09-24 ENCOUNTER — HOSPITAL ENCOUNTER (OUTPATIENT)
Age: 39
Setting detail: OUTPATIENT SURGERY
Discharge: HOME | End: 2024-09-24
Attending: ORTHOPAEDIC SURGERY | Admitting: ORTHOPAEDIC SURGERY
Payer: COMMERCIAL

## 2024-09-24 ENCOUNTER — ANESTHESIA (OUTPATIENT)
Age: 39
End: 2024-09-24
Payer: COMMERCIAL

## 2024-09-24 VITALS
WEIGHT: 246 LBS | SYSTOLIC BLOOD PRESSURE: 165 MMHG | HEIGHT: 71 IN | RESPIRATION RATE: 12 BRPM | HEART RATE: 88 BPM | DIASTOLIC BLOOD PRESSURE: 94 MMHG | TEMPERATURE: 98 F | BODY MASS INDEX: 34.44 KG/M2 | OXYGEN SATURATION: 95 %

## 2024-09-24 DIAGNOSIS — S46.212A RUPTURE OF LEFT DISTAL BICEPS TENDON, INITIAL ENCOUNTER: Primary | ICD-10-CM

## 2024-09-24 PROCEDURE — 24342 REPAIR OF RUPTURED TENDON: CPT | Performed by: ORTHOPAEDIC SURGERY

## 2024-09-24 PROCEDURE — C1713 ANCHOR/SCREW BN/BN,TIS/BN: HCPCS | Performed by: ORTHOPAEDIC SURGERY

## 2024-09-24 DEVICE — IMPL DELIVERY SYS,DISTAL BICEPS REPR
Type: IMPLANTABLE DEVICE | Site: ELBOW | Status: FUNCTIONAL
Brand: ARTHREX®

## 2024-09-24 RX ORDER — ONDANSETRON 4 MG/1
4 TABLET, ORALLY DISINTEGRATING ORAL EVERY 8 HOURS PRN
Qty: 15 TABLET | Refills: 0 | Status: SHIPPED | OUTPATIENT
Start: 2024-09-24

## 2024-09-24 RX ORDER — ALBUTEROL SULFATE 0.83 MG/ML
2.5 SOLUTION RESPIRATORY (INHALATION) ONCE AS NEEDED
Status: DISCONTINUED | OUTPATIENT
Start: 2024-09-24 | End: 2024-09-24 | Stop reason: HOSPADM

## 2024-09-24 RX ORDER — DEXAMETHASONE SODIUM PHOSPHATE 10 MG/ML
INJECTION, SOLUTION INTRAMUSCULAR; INTRAVENOUS AS NEEDED
Status: DISCONTINUED | OUTPATIENT
Start: 2024-09-24 | End: 2024-09-24

## 2024-09-24 RX ORDER — ROCURONIUM BROMIDE 10 MG/ML
INJECTION, SOLUTION INTRAVENOUS AS NEEDED
Status: DISCONTINUED | OUTPATIENT
Start: 2024-09-24 | End: 2024-09-24

## 2024-09-24 RX ORDER — TRANEXAMIC ACID 10 MG/ML
1000 INJECTION, SOLUTION INTRAVENOUS ONCE
Status: COMPLETED | OUTPATIENT
Start: 2024-09-24 | End: 2024-09-24

## 2024-09-24 RX ORDER — MIDAZOLAM HYDROCHLORIDE 2 MG/2ML
INJECTION, SOLUTION INTRAMUSCULAR; INTRAVENOUS AS NEEDED
Status: DISCONTINUED | OUTPATIENT
Start: 2024-09-24 | End: 2024-09-24

## 2024-09-24 RX ORDER — NAPROXEN 500 MG/1
500 TABLET ORAL 2 TIMES DAILY WITH MEALS
Qty: 60 TABLET | Refills: 0 | Status: SHIPPED | OUTPATIENT
Start: 2024-09-24

## 2024-09-24 RX ORDER — LABETALOL HYDROCHLORIDE 5 MG/ML
5 INJECTION, SOLUTION INTRAVENOUS
Status: DISCONTINUED | OUTPATIENT
Start: 2024-09-24 | End: 2024-09-24 | Stop reason: HOSPADM

## 2024-09-24 RX ORDER — FENTANYL CITRATE 50 UG/ML
INJECTION, SOLUTION INTRAMUSCULAR; INTRAVENOUS AS NEEDED
Status: DISCONTINUED | OUTPATIENT
Start: 2024-09-24 | End: 2024-09-24

## 2024-09-24 RX ORDER — PROPOFOL 10 MG/ML
INJECTION, EMULSION INTRAVENOUS AS NEEDED
Status: DISCONTINUED | OUTPATIENT
Start: 2024-09-24 | End: 2024-09-24

## 2024-09-24 RX ORDER — HYDROMORPHONE HCL/PF 1 MG/ML
0.5 SYRINGE (ML) INJECTION
Status: DISCONTINUED | OUTPATIENT
Start: 2024-09-24 | End: 2024-09-24 | Stop reason: HOSPADM

## 2024-09-24 RX ORDER — SENNOSIDES 8.6 MG
650 CAPSULE ORAL EVERY 6 HOURS PRN
Qty: 90 TABLET | Refills: 0 | Status: SHIPPED | OUTPATIENT
Start: 2024-09-24

## 2024-09-24 RX ORDER — SUCCINYLCHOLINE/SOD CL,ISO/PF 100 MG/5ML
SYRINGE (ML) INTRAVENOUS AS NEEDED
Status: DISCONTINUED | OUTPATIENT
Start: 2024-09-24 | End: 2024-09-24

## 2024-09-24 RX ORDER — ONDANSETRON 2 MG/ML
INJECTION INTRAMUSCULAR; INTRAVENOUS AS NEEDED
Status: DISCONTINUED | OUTPATIENT
Start: 2024-09-24 | End: 2024-09-24

## 2024-09-24 RX ORDER — CEFAZOLIN SODIUM 2 G/50ML
2000 SOLUTION INTRAVENOUS ONCE
Status: COMPLETED | OUTPATIENT
Start: 2024-09-24 | End: 2024-09-24

## 2024-09-24 RX ORDER — OXYCODONE HYDROCHLORIDE 5 MG/1
5 TABLET ORAL EVERY 4 HOURS PRN
Qty: 10 TABLET | Refills: 0 | Status: SHIPPED | OUTPATIENT
Start: 2024-09-24 | End: 2024-10-04

## 2024-09-24 RX ORDER — OXYCODONE HYDROCHLORIDE 10 MG/1
10 TABLET ORAL EVERY 4 HOURS PRN
Status: DISCONTINUED | OUTPATIENT
Start: 2024-09-24 | End: 2024-09-24 | Stop reason: HOSPADM

## 2024-09-24 RX ORDER — OXYCODONE HYDROCHLORIDE 5 MG/1
5 TABLET ORAL EVERY 4 HOURS PRN
Status: DISCONTINUED | OUTPATIENT
Start: 2024-09-24 | End: 2024-09-24 | Stop reason: HOSPADM

## 2024-09-24 RX ORDER — KETOROLAC TROMETHAMINE 30 MG/ML
INJECTION, SOLUTION INTRAMUSCULAR; INTRAVENOUS AS NEEDED
Status: DISCONTINUED | OUTPATIENT
Start: 2024-09-24 | End: 2024-09-24

## 2024-09-24 RX ORDER — SODIUM CHLORIDE, SODIUM LACTATE, POTASSIUM CHLORIDE, CALCIUM CHLORIDE 600; 310; 30; 20 MG/100ML; MG/100ML; MG/100ML; MG/100ML
125 INJECTION, SOLUTION INTRAVENOUS CONTINUOUS
Status: DISCONTINUED | OUTPATIENT
Start: 2024-09-24 | End: 2024-09-24 | Stop reason: HOSPADM

## 2024-09-24 RX ORDER — PROMETHAZINE HYDROCHLORIDE 25 MG/ML
12.5 INJECTION, SOLUTION INTRAMUSCULAR; INTRAVENOUS ONCE AS NEEDED
Status: DISCONTINUED | OUTPATIENT
Start: 2024-09-24 | End: 2024-09-24 | Stop reason: HOSPADM

## 2024-09-24 RX ORDER — MEPERIDINE HYDROCHLORIDE 50 MG/ML
12.5 INJECTION INTRAMUSCULAR; INTRAVENOUS; SUBCUTANEOUS ONCE
Status: DISCONTINUED | OUTPATIENT
Start: 2024-09-24 | End: 2024-09-24 | Stop reason: HOSPADM

## 2024-09-24 RX ORDER — FENTANYL CITRATE/PF 50 MCG/ML
25 SYRINGE (ML) INJECTION
Status: COMPLETED | OUTPATIENT
Start: 2024-09-24 | End: 2024-09-24

## 2024-09-24 RX ORDER — LIDOCAINE HYDROCHLORIDE 10 MG/ML
INJECTION, SOLUTION EPIDURAL; INFILTRATION; INTRACAUDAL; PERINEURAL AS NEEDED
Status: DISCONTINUED | OUTPATIENT
Start: 2024-09-24 | End: 2024-09-24

## 2024-09-24 RX ORDER — ONDANSETRON 2 MG/ML
4 INJECTION INTRAMUSCULAR; INTRAVENOUS ONCE AS NEEDED
Status: DISCONTINUED | OUTPATIENT
Start: 2024-09-24 | End: 2024-09-24 | Stop reason: HOSPADM

## 2024-09-24 RX ORDER — LIDOCAINE HYDROCHLORIDE 10 MG/ML
0.5 INJECTION, SOLUTION EPIDURAL; INFILTRATION; INTRACAUDAL; PERINEURAL ONCE AS NEEDED
Status: DISCONTINUED | OUTPATIENT
Start: 2024-09-24 | End: 2024-09-24 | Stop reason: HOSPADM

## 2024-09-24 RX ORDER — CHLORHEXIDINE GLUCONATE ORAL RINSE 1.2 MG/ML
15 SOLUTION DENTAL ONCE
Status: COMPLETED | OUTPATIENT
Start: 2024-09-24 | End: 2024-09-24

## 2024-09-24 RX ADMIN — FENTANYL CITRATE 25 MCG: 50 INJECTION INTRAMUSCULAR; INTRAVENOUS at 09:10

## 2024-09-24 RX ADMIN — ONDANSETRON 4 MG: 2 INJECTION INTRAMUSCULAR; INTRAVENOUS at 07:36

## 2024-09-24 RX ADMIN — ROCURONIUM BROMIDE 10 MG: 10 INJECTION, SOLUTION INTRAVENOUS at 07:52

## 2024-09-24 RX ADMIN — TRANEXAMIC ACID 1000 MG: 10 INJECTION, SOLUTION INTRAVENOUS at 07:38

## 2024-09-24 RX ADMIN — KETOROLAC TROMETHAMINE 15 MG: 30 INJECTION, SOLUTION INTRAMUSCULAR at 08:07

## 2024-09-24 RX ADMIN — FENTANYL CITRATE 25 MCG: 50 INJECTION INTRAMUSCULAR; INTRAVENOUS at 09:04

## 2024-09-24 RX ADMIN — PROPOFOL 100 MG: 10 INJECTION, EMULSION INTRAVENOUS at 07:34

## 2024-09-24 RX ADMIN — Medication 100 MG: at 07:32

## 2024-09-24 RX ADMIN — ROCURONIUM BROMIDE 45 MG: 10 INJECTION, SOLUTION INTRAVENOUS at 07:36

## 2024-09-24 RX ADMIN — SODIUM CHLORIDE, SODIUM LACTATE, POTASSIUM CHLORIDE, AND CALCIUM CHLORIDE 125 ML/HR: .6; .31; .03; .02 INJECTION, SOLUTION INTRAVENOUS at 06:34

## 2024-09-24 RX ADMIN — FENTANYL CITRATE 25 MCG: 50 INJECTION INTRAMUSCULAR; INTRAVENOUS at 08:05

## 2024-09-24 RX ADMIN — PROPOFOL 200 MG: 10 INJECTION, EMULSION INTRAVENOUS at 07:31

## 2024-09-24 RX ADMIN — FENTANYL CITRATE 25 MCG: 50 INJECTION INTRAMUSCULAR; INTRAVENOUS at 09:14

## 2024-09-24 RX ADMIN — ROCURONIUM BROMIDE 5 MG: 10 INJECTION, SOLUTION INTRAVENOUS at 07:31

## 2024-09-24 RX ADMIN — LIDOCAINE HYDROCHLORIDE 50 MG: 10 SOLUTION INTRAVENOUS at 07:30

## 2024-09-24 RX ADMIN — FENTANYL CITRATE 25 MCG: 50 INJECTION INTRAMUSCULAR; INTRAVENOUS at 09:19

## 2024-09-24 RX ADMIN — CHLORHEXIDINE GLUCONATE 15 ML: 1.2 RINSE ORAL at 06:33

## 2024-09-24 RX ADMIN — LABETALOL HYDROCHLORIDE 5 MG: 5 INJECTION, SOLUTION INTRAVENOUS at 09:18

## 2024-09-24 RX ADMIN — OXYCODONE 5 MG: 5 TABLET ORAL at 09:37

## 2024-09-24 RX ADMIN — CEFAZOLIN SODIUM 2000 MG: 2 SOLUTION INTRAVENOUS at 07:29

## 2024-09-24 RX ADMIN — FENTANYL CITRATE 100 MCG: 50 INJECTION INTRAMUSCULAR; INTRAVENOUS at 07:30

## 2024-09-24 RX ADMIN — DEXAMETHASONE SODIUM PHOSPHATE 10 MG: 10 INJECTION INTRAMUSCULAR; INTRAVENOUS at 07:36

## 2024-09-24 RX ADMIN — MIDAZOLAM 2 MG: 1 INJECTION INTRAMUSCULAR; INTRAVENOUS at 07:23

## 2024-09-24 RX ADMIN — SUGAMMADEX 200 MG: 100 INJECTION, SOLUTION INTRAVENOUS at 08:17

## 2024-09-24 NOTE — ANESTHESIA POSTPROCEDURE EVALUATION
Post-Op Assessment Note    CV Status:  Stable  Pain Score: 0    Pain management: adequate       Mental Status:  Sleepy   Hydration Status:  Stable   PONV Controlled:  Controlled   Airway Patency:  Patent  Airway: intubated    Anethesia notable event occurred.    Staff: Anesthesiologist   Comments: Large doses of propofol needed.  Unable to place tube with MAC 3 DL.  Unable to mask with oral airway and two handed mask.  Intubated using Iglesias 3 blade and stylet.  Only arytenoids visualized.     Difficult intubation letter: given to patient            BP      Temp      Pulse     Resp      SpO2        Large doses of propofol needed.  Unable to place tube with MAC 3 DL.  Unable to mask with oral airway and two handed mask.  Intubated using Iglesias 3 blade and stylet.  Only arytenoids visualized.

## 2024-09-24 NOTE — ANESTHESIA POSTPROCEDURE EVALUATION
Post-Op Assessment Note    CV Status:  Stable    Pain management: adequate       Mental Status:  Sleepy   Hydration Status:  Euvolemic   PONV Controlled:  Controlled   Airway Patency:  Patent     Post Op Vitals Reviewed: Yes    No anethesia notable event occurred.    Staff: CRNA               BP (!) 184/101 (09/24/24 0840)    Temp (!) 96.5 °F (35.8 °C) (09/24/24 0840)    Pulse  88   Resp 14 (09/24/24 0840)    SpO2 100 % (09/24/24 0840)

## 2024-09-24 NOTE — ANESTHESIA PREPROCEDURE EVALUATION
Procedure:  REPAIR TENDON BICEPS, distal (Left: Elbow)    Relevant Problems   MUSCULOSKELETAL   (+) Rectus diastasis        Physical Exam    Airway    Mallampati score: III  TM Distance: >3 FB  Neck ROM: full     Dental        Cardiovascular      Pulmonary      Other Findings        Anesthesia Plan  ASA Score- 2     Anesthesia Type- general with ASA Monitors.         Additional Monitors:     Airway Plan: ETT.    Comment: Patient seen and examined.  History reviewed.  Patient to be done under general anesthesia with GETA or LMA and routine monitors.  IS block with Exparel to be performed preoperatively for post-op pain.  Risks discussed with the patient. Consent obtained..       Plan Factors-Exercise tolerance (METS): >4 METS.    Chart reviewed.   Existing labs reviewed. Patient summary reviewed.                  Induction- intravenous.    Postoperative Plan- Plan for postoperative opioid use. Planned trial extubation        Informed Consent- Anesthetic plan and risks discussed with patient.  I personally reviewed this patient with the CRNA. Discussed and agreed on the Anesthesia Plan with the CRNA..

## 2024-09-24 NOTE — OP NOTE
OPERATIVE REPORT    PATIENT NAME: Marquis Peña   :  1985  MRN: [id number]  Pt Location: [facility] OR ROOM 05    SURGERY DATE: 2024    SURGEON(S) and ROLE:  Primary: Dave Kruger MD  Assisting: Santiago Anders PA-C  Observing:  Monica Jones DPM    NOTE:  The presence of a physician assistant was necessary to help with patient positioning, surgical exposure, wound retraction, wound closure, and other key portions of the procedure.  No qualified resident was available for this case.      PREOPERATIVE DIAGNOSES:  Left Distal Biceps Tendon Rupture    POSTOPERATIVE DIAGNOSES:  Same as Preoperative Diagnosis    PROCEDURES:  Left Distal Biceps Tendon Repair      ANESTHESIA TYPE:  General endotracheal    ANESTHESIA STAFF:   Anesthesiologist: Dariusz Gonzáles MD  CRNA: Gregg Kevin CRNA    ESTIMATED BLOOD LOSS:  5 mL    TOURNIQUET TIME:  Not used    PERIOPERATIVE ANTIBIOTICS:  cefazolin, 2 grams    IMPLANTS:  Arthrex distal biceps button    Implant Name Type Inv. Item Serial No.  Lot No. LRB No. Used Action   SYSTEM IMPLANT DSTL BICEP REPAIR - [id number]  SYSTEM IMPLANT DSTL BICEP REPAIR  [id number] 1 Implanted       SPECIMENS:  * No specimens in log *    DRAINS:  None      OPERATIVE INDICATIONS:  The patient is a 38 y.o. male with left elbow pain and a distal biceps tendon tear.  Surgical treatment was indicated due to displacement and liklihood of prolonged or permanent functional impairment with non-surgical treatment.  After a thorough discussion of the potential risks, benefits, and alternative treatments, the patient agreed to proceed with surgery.  The patient understands that the risks of surgery include, but are not limited to: failure of repair, infection, neurovascular injury, wound healing complications, venous thromboembolism, persistent pain, stiffness, instability, recurrence of symptoms, potential need for additional surgeries, and loss of limb or life.  Oral and  written consent for surgery was obtained from the patient preoperatively.    PROCEDURE AND TECHNIQUE:  On the day of surgery, the patient was identified in the preoperative holding area.  The operative site was marked by the surgeon.  The patient was taken into the operating room.  A time-out was conducted to confirm the patient's identity, the operative site, and the proposed procedure.  The patient was anesthetized, and perioperative antibiotics were administered.  The patient was positioned supine on the OR table.  All bony prominences were padded.  A tourniquet was not used.  The operative site was prepped and draped using standard sterile technique.      A transverse incision was made just distal to the elbow flexion crease.  Full thickness skin flaps were developed.  Care was taken to identify and protect the lateral antebrachial cutaneous nerve.  Blunt dissection was carried proximally to identify the distal biceps tendon.  The tendon was delivered into the wound, and all degenerative tendon tissue and scar tissue was debrided.  The tendon was sutured with #2 FiberLoop and sized to 6 mm diameter.  The Distal Biceps Button (Arthrex) was placed onto the suture ends.    The interval between the pronator teres and brachioradialis was developed.  Dissection was carried down to the radial tuberosity.  Retractors were placed medially and laterally along the proximal radius taking care to stay directly on bone to avoid injuring the posterior interosseous nerve laterally.  A bicortical guide pin was placed in the distal and ulnar aspect of the tuberosity.  Fluoroscopy confirmed appropriate pin placement.  A unicortical socket was reamed over the guidepin.  The Distal Biceps Button was positioned on the far cortex and flipped.  Fluoroscopy confirmed proper placement and seating of the button.  The sutures were used to shuttle the distal biceps tendon into the socket.  One suture limb was passed through the tendon, and  the sutures were tied with alternating half-hitches.  A biotenodesis screw was not placed into the socket.  The repair was stable without undue tension on the repair through 0 degrees of elbow extension.    The wound was irrigated with sterile saline.  The skin was closed with 3-0 vicryl, 4-0 monocryl, and steri-strips.  A sterile dressing was applied.  The drapes were removed, and the patient was given a soft bandage.  The patient was awakened from anesthesia and taken to recovery in stable condition.      COMPLICATIONS:  None    PATIENT DISPOSITION:  PACU       SIGNATURE:  Dave Kruger MD  DATE:  September 24, 2024  TIME:  8:15 AM

## 2024-09-24 NOTE — DISCHARGE INSTR - AVS FIRST PAGE
POSTOPERATIVE INSTRUCTIONS    MEDICATIONS:  Resume all home medications unless otherwise instructed by your surgeon.  Pain Medication:  Oxycodone 5 mg, 1-2 tablets every 4 hours as needed  If you were given a regional anesthetic (nerve block), please begin taking the pain medication as soon as you get home, even if you have minimal or no pain.  DO NOT WAIT FOR THE NERVE BLOCK TO WEAR OFF.  Possible side effects include nausea, constipation, and urinary retention.  If you experience these side effects, please call our office for assistance.  Pain med refills are authorized only during office hours (8am-4pm, Mon-Fri).  Anti-Inflammatory:  Tylenol 650 mg, 1 tablet every 6 hours and Naproxen 500 mg, 1 tablet every 12 hours  Take with food.  Stop if you experience nausea, reflux, or stomach pain.  Nausea Medication:  Zofran ODT 4 mg, 1 tablet every 6 hours as needed  Fill prescription ONLY if you expericnce severe nausea.    WOUND CARE:  Keep the dressing clean and dry.  Light drainage may occur the first 2 days postop.  You may remove the dressings and get the incision wet in the shower 72 hours after surgery.  DO NOT remove steri-strips or sutures.  DO NOT immerse the incision under water.  Carefully pat the incision dry.  If there is wound drainage, re-apply a fresh dry gauze dressing.  Please call our office ([phone]) if you experience any of the following:  Sudden increase in swelling, redness, or warmth at the surgical site  Excessive incisional drainage that persists beyond the 3rd day after surgery  Oral temperature greater than 101 degrees, not relieved with Tylenol  Shortness of breath, chest pain, nausea, or any other concerning symptoms    SWELLING CONTROL:  Cold Therapy:  Apply ice (20 min on, 20 min off) as often as you feel is necessary.  Elevation:  Keep your arm at or above heart level as much as possible.    IMMOBILIZATION:  Wear your sling AT ALL TIMES (including sleep) until your first  postoperative office visit.  You may remove the sling for showering but must keep your arm at your side.    ACTIVITY:  DO NOT lift, carry, push, or pull anything with your arm until cleared by your surgeon.  Wrist / Finger Motion:  Move your wrist and fingers (if not immobilized in splint) through a full range of motion 20 times per hour while awake.    PHYSICAL THERAPY:  Begin therapy 5 TO 7 DAYS AFTER SURGERY.  You were given a prescription for therapy at your preoperative office visit.  If you do not have physical therapy scheduled yet, please call our office for assistance.    FOLLOW-UP APPOINTMENT:  4-5 days after surgery with:    Dr. Dave Kruger MD  [facility]  [address] [place] [address], [address], [place] [address]  [phone] ([place] Office)  [phone] (After Hours)

## 2024-09-24 NOTE — INTERVAL H&P NOTE
H&P reviewed. After examining the patient I find no changes in the patients condition since the H&P had been written.    Vitals:    09/24/24 0620   BP: (!) 174/104   Pulse: 87   Resp: 18   Temp: (!) 97.3 °F (36.3 °C)   SpO2: 97%

## 2024-09-30 ENCOUNTER — TELEPHONE (OUTPATIENT)
Age: 39
End: 2024-09-30

## 2024-09-30 NOTE — TELEPHONE ENCOUNTER
I did speak with the patient and instructed him to leave the plastic/glue intact at this time.  Also he should be wearing the sling full-time unless he is performing hygiene activities.  I did instruct him to remove the sling 3 times a day to work on elbow range of motion through comfortable range until we see him in the office.  Can you please contact the patient to move his appointment to Wednesday at the [facility] office.

## 2024-09-30 NOTE — TELEPHONE ENCOUNTER
Caller: Patient     Doctor: Saskia     Reason for call: Patient asked if he can wash of the plastic pieces off his surgical site. Patient also asked if he still has to use the sling    Call back#: [phone]

## 2024-10-04 ENCOUNTER — OFFICE VISIT (OUTPATIENT)
Dept: OBGYN CLINIC | Facility: MEDICAL CENTER | Age: 39
End: 2024-10-04

## 2024-10-04 VITALS — BODY MASS INDEX: 34.44 KG/M2 | WEIGHT: 246 LBS | HEIGHT: 71 IN

## 2024-10-04 DIAGNOSIS — S46.212A RUPTURE OF LEFT DISTAL BICEPS TENDON, INITIAL ENCOUNTER: Primary | ICD-10-CM

## 2024-10-04 PROCEDURE — 99024 POSTOP FOLLOW-UP VISIT: CPT | Performed by: PHYSICIAN ASSISTANT

## 2024-10-04 NOTE — PROGRESS NOTES
Orthopaedic Surgery - Office Note  Marquis Peña (38 y.o. male)   : 1985   MRN: [id number]  Encounter Date: 10/4/2024    Assessment / Plan    Status post left distal biceps tendon repair on 2024  Continue with ice and analgesics/anti-inflammatories as needed.  Patient was provided a copy of the biceps tendon repair protocol which at this time he will probably follow on his own for the first 2 months.  He knows he does have a 1 pound lifting restriction using the left arm and should be mostly in the sling especially for the first month.  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  He will have the restriction of not lifting, pushing or pulling using the left arm at that time and should be allowed to use a sling full-time if needed.  I did stress to the patient that strengthening starts after the first 2 months so even if he feels like he can progress we do want him to limit his lifting until that time.  Follow-up:  Return in about 5 weeks (around 2024) for follow up with Dr. Kruger.      Chief Complaint / Date of Onset  Left elbow  Injury Mechanism / Date  Preacher curl at a  / 24  Surgery / Date  Left distal biceps tendon repair on 2024    History of Present Illness   Marquis Peña is a 38 y.o. male who presents for follow-up status post left distal biceps tendon repair on 2024.  Overall the patient is doing well at this time.  His pain seems very well-controlled along with the swelling.  He has been in the sling most of the time coming up mainly to do range of motion exercises.  He states that he feels some stiffness around the incision and in the forearm otherwise again his pain is minimal.  He denies any distal numbness or tingling at this time and does have full movement in his hand with a feeling of a little bit of stiffness.    Treatment Summary  Medications / Modalities  None  Bracing /  "Immobilization  None  Physical Therapy  None  Injections  None  Prior Surgeries  None  Other Treatments  None     Employment / Current Status       Sport / Organization / Current Status  Weight lifting      Review of Systems  Pertinent items are noted in HPI.  All other systems were reviewed and are negative.      Physical Exam  Ht 5' 11\" (1.803 m)   Wt 112 kg (246 lb)   BMI 34.31 kg/m²   Cons: Appears well.  No apparent distress.  Psych: Alert. Oriented x3.  Mood and affect normal.  Eyes: PERRLA, EOMI  Resp: Normal effort.  No audible wheezing or stridor.  CV: Palpable pulse.  No discernable arrhythmia.  No LE edema.  Lymph:  No palpable cervical, axillary, or inguinal lymphadenopathy.  Skin: Warm.  No palpable masses.  No visible lesions.  Neuro: Normal muscle tone.  Normal and symmetric DTR's.     Left Elbow Exam  Alignment:  Normal elbow alignment and carrying angle.  Inspection:   Incisions healing well without any dehiscence, redness or drainage.  Palpation:   Mild tenderness at adelia-incisional area.  Distal biceps tendon was palpable.  ROM:  Normal elbow ROM. Normal wrist ROM. Normal finger ROM.  Strength:  Not tested.  Stability:  Not tested.  Tests:  No pertinent positive or negative tests.  Neurovascular:  Sensation intact in Ax/R/M/U nerve distributions. Sensation intact in all digital nerve distributions. Fingers warm and perfused.       Studies Reviewed  No studies to review      Procedures  No procedures today.    Medical, Surgical, Family, and Social History  The patient's medical history, family history, and social history, were reviewed and updated as appropriate.    Past Medical History:   Diagnosis Date    Allergic rhinitis     Anaphylaxis     Difficult intubation     Food intolerance     GERD (gastroesophageal reflux disease)     Hypertension     Urticaria        Past Surgical History:   Procedure Laterality Date    KNEE SURGERY      [place] RINSJ RPTD BICEPS/TRICEPS TDN DSTL W/WO " TDN GRF Left 9/24/2024    Procedure: REPAIR TENDON BICEPS, distal;  Surgeon: Dave Kruger MD;  Location: [facility];  Service: Orthopedics       History reviewed. No pertinent family history.    Social History     Occupational History    Not on file   Tobacco Use    Smoking status: Never     Passive exposure: Past    Smokeless tobacco: Never   Vaping Use    Vaping status: Never Used   Substance and Sexual Activity    Alcohol use: Not Currently    Drug use: Yes     Types: Marijuana     Comment: 9/23/24    Sexual activity: Not on file       Allergies   Allergen Reactions    Nuts - Food Allergy Anaphylaxis     Pt denies    Pollen Extract Allergic Rhinitis         Current Outpatient Medications:     amLODIPine (NORVASC) 10 mg tablet, Take 10 mg by mouth daily, Disp: , Rfl:     famotidine (PEPCID) 10 mg tablet, Take 10 mg by mouth 2 (two) times a day as needed, Disp: , Rfl:     loratadine (CLARITIN) 10 mg tablet, Take 10 mg by mouth daily, Disp: , Rfl:     naproxen (NAPROSYN) 500 mg tablet, Take 1 tablet (500 mg total) by mouth 2 (two) times a day with meals, Disp: 60 tablet, Rfl: 0    acetaminophen (TYLENOL) 650 mg CR tablet, Take 1 tablet (650 mg total) by mouth every 6 (six) hours as needed for mild pain (Patient not taking: Reported on 10/4/2024), Disp: 90 tablet, Rfl: 0    ondansetron (ZOFRAN-ODT) 4 mg disintegrating tablet, Take 1 tablet (4 mg total) by mouth every 8 (eight) hours as needed for nausea or vomiting (Patient not taking: Reported on 10/4/2024), Disp: 15 tablet, Rfl: 0    oxyCODONE (ROXICODONE) 5 immediate release tablet, Take 1 tablet (5 mg total) by mouth every 4 (four) hours as needed for moderate pain for up to 10 days Max Daily Amount: 30 mg (Patient not taking: Reported on 10/4/2024), Disp: 10 tablet, Rfl: 0      Santiago Anders PA-C    Scribe Attestation      I,:   am acting as a scribe while in the presence of the attending physician.:       I,:   personally performed the services  described in this documentation    as scribed in my presence.:

## 2024-10-04 NOTE — LETTER
October 4, 2024     Patient: Marquis Peña  YOB: 1985  Date of Visit: 10/4/2024      To Whom it May Concern:    Marquis Peña is under my professional care.  was seen in my office on 10/4/2024.  had surgery on 9/24/24 and should be out of work until 10/7/24 at which time he can return with light duty of no lifting, pushing or pulling with the L arm and can wear a sling if needed. We reevaluate him in 5 weeks for updated restrictions.    If you have any questions or concerns, please don't hesitate to call.         Sincerely,          Santiago Anders PA-C        CC: No Recipients

## 2024-10-15 ENCOUNTER — TELEPHONE (OUTPATIENT)
Dept: OBGYN CLINIC | Facility: HOSPITAL | Age: 39
End: 2024-10-15

## 2024-10-15 NOTE — TELEPHONE ENCOUNTER
Caller: Patient    Doctor: Saskia    Reason for call: Patient had a cut inside of his mouth after having surgery on 9/24 and believes he now has an infection. Patient is requesting antibiotics.    Call back#: [phone]

## 2024-10-21 DIAGNOSIS — S46.212A RUPTURE OF LEFT DISTAL BICEPS TENDON, INITIAL ENCOUNTER: ICD-10-CM

## 2024-10-22 RX ORDER — NAPROXEN 500 MG/1
500 TABLET ORAL 2 TIMES DAILY WITH MEALS
Qty: 60 TABLET | Refills: 0 | Status: SHIPPED | OUTPATIENT
Start: 2024-10-22

## 2024-11-06 ENCOUNTER — TELEPHONE (OUTPATIENT)
Age: 39
End: 2024-11-06

## 2024-11-06 NOTE — TELEPHONE ENCOUNTER
Caller: Patient    Doctor: Saskia    Reason for call: Per the patient please do not complete any disability forms w/out checking w/him first    Call back#: [phone]

## 2024-12-02 ENCOUNTER — TELEPHONE (OUTPATIENT)
Age: 39
End: 2024-12-02

## 2024-12-04 NOTE — TELEPHONE ENCOUNTER
Confirmed that we haven't received any imaging from [facility].  Patient going to call hospital.

## 2024-12-18 ENCOUNTER — TELEPHONE (OUTPATIENT)
Age: 39
End: 2024-12-18

## 2024-12-18 NOTE — TELEPHONE ENCOUNTER
Caller: Patient    Doctor: Saskia    Reason for call: Patient calling to schedule appointment req'ing it be the same day as his General surgeon appointment 12/30.  He had 2 previous appointment one 11/8 he cancelled and 11/15 no show.  He also has an issue with the right arm and he wants both issues can be seen at the same time.  Patient requested we do emergency case.    He is ask for the Dr or PA to call him as he wants to update them on the situation.    Dr Kruger do you feel a force on is necessary or is the next available ok?       If a force on is needed please have clinical call and schedule accordingly.       Call back#: [phone]

## 2024-12-19 NOTE — TELEPHONE ENCOUNTER
Caller: Patient- Tra    Doctor: Dr Kruger    Reason for call: Patient is calling back in from a missed call he is stating that he does not live close to the office anymore and has a new injury to his right elbow/shoulder and wanted to have the Dr speak with him relating this. He also wanted to make one appointment for his post op and new issue and I advised he would need to make two separate appointments as patient was not happy with this and stated that he is going to message the Dr on [place] to get help and disconnected the call. Patient stated that the Dr before gave him advise on his injury before and he is not understanding why the Dr or PA is not able to contact him back.     Call back#: [phone]

## 2024-12-19 NOTE — TELEPHONE ENCOUNTER
Can you see if he wants to be seen for the other arm sooner with sports medicine for workup.  We can follow up with him for the L then when lenny is available. I will call him tomorrow between cases just discuss with him.

## 2024-12-19 NOTE — TELEPHONE ENCOUNTER
Lvm stating that we do not have any openings on 12/30- to please call to sched an apt at next available opening

## 2024-12-20 NOTE — TELEPHONE ENCOUNTER
I did leave a voicemail for the patient stating that I would be able to see him on the 30th for evaluation of both his surgical arm and his new issue so we started workup for that arm.  Please get him a call to help schedule.  If he has any further questions please let me know and I will be happy to talk to them about it.

## (undated) DEVICE — 3M™ STERI-DRAPE™ U-DRAPE 1015: Brand: STERI-DRAPE™

## (undated) DEVICE — DRAPE EQUIPMENT RF WAND

## (undated) DEVICE — CHLORAPREP HI-LITE 26ML ORANGE

## (undated) DEVICE — U-DRAPE: Brand: CONVERTORS

## (undated) DEVICE — EXOFIN PRECISION PEN HIGH VISCOSITY TOPICAL SKIN ADHESIVE: Brand: EXOFIN PRECISION PEN, 1G

## (undated) DEVICE — GLOVE INDICATOR PI UNDERGLOVE SZ 8.5 BLUE

## (undated) DEVICE — GLOVE INDICATOR PI UNDERGLOVE SZ 7 BLUE

## (undated) DEVICE — GLOVE SRG BIOGEL 6.5

## (undated) DEVICE — DRESSING MEPILEX AG BORDER POST-OP 4 X 8 IN

## (undated) DEVICE — ASTOUND STANDARD SURGICAL GOWN, XL: Brand: CONVERTORS

## (undated) DEVICE — SUT STRATAFIX SPIRAL MONOCRYL PLUS 4-0 PS-2 30CM SXMP1B117

## (undated) DEVICE — MEDI-VAC YANKAUER SUCTION HANDLE W/BULBOUS AND CONTROL VENT: Brand: CARDINAL HEALTH

## (undated) DEVICE — NEEDLE HYPO 23G X 1-1/2 IN

## (undated) DEVICE — DISPOSABLE OR TOWEL: Brand: CARDINAL HEALTH

## (undated) DEVICE — COBAN 4 IN STERILE

## (undated) DEVICE — ACE WRAP 6 IN STERILE

## (undated) DEVICE — SKIN MARKER DUAL TIP WITH RULER CAP, FLEXIBLE RULER AND LABELS: Brand: DEVON

## (undated) DEVICE — INTENT TO BE USED WITH SUTURE MATERIAL FOR TISSUE CLOSURE: Brand: RICHARD-ALLAN® NEEDLE 1/2 CIRCLE TAPER

## (undated) DEVICE — GLOVE SRG BIOGEL 8

## (undated) DEVICE — INTENDED FOR TISSUE SEPARATION, AND OTHER PROCEDURES THAT REQUIRE A SHARP SURGICAL BLADE TO PUNCTURE OR CUT.: Brand: BARD-PARKER ® CARBON RIB-BACK BLADES

## (undated) DEVICE — TUBING SUCTION 5MM X 12 FT

## (undated) DEVICE — SYRINGE 20ML LL

## (undated) DEVICE — CYSTO TUBING TUR Y IRRIGATION

## (undated) DEVICE — DRESSING MEPILEX AG BORDER POST-OP 4 X 6 IN

## (undated) DEVICE — CAST PADDING 4 IN SYNTHETIC NON-STRL

## (undated) DEVICE — STERILE BETHLEHEM PLASTIC HAND: Brand: CARDINAL HEALTH

## (undated) DEVICE — SUT VICRYL 3-0 SH 27 IN J416H

## (undated) DEVICE — GLOVE SRG BIOGEL 7.5

## (undated) DEVICE — PADDING CAST 4 IN  COTTON STRL